# Patient Record
Sex: MALE | Race: BLACK OR AFRICAN AMERICAN | Employment: UNEMPLOYED | URBAN - METROPOLITAN AREA
[De-identification: names, ages, dates, MRNs, and addresses within clinical notes are randomized per-mention and may not be internally consistent; named-entity substitution may affect disease eponyms.]

---

## 2017-01-01 ENCOUNTER — TELEPHONE (OUTPATIENT)
Dept: INTERNAL MEDICINE CLINIC | Age: 62
End: 2017-01-01

## 2017-01-01 ENCOUNTER — OFFICE VISIT (OUTPATIENT)
Dept: INTERNAL MEDICINE CLINIC | Age: 62
End: 2017-01-01

## 2017-01-01 VITALS
BODY MASS INDEX: 16.1 KG/M2 | HEART RATE: 88 BPM | HEIGHT: 71 IN | DIASTOLIC BLOOD PRESSURE: 56 MMHG | OXYGEN SATURATION: 100 % | SYSTOLIC BLOOD PRESSURE: 100 MMHG | WEIGHT: 115 LBS | TEMPERATURE: 98.8 F | RESPIRATION RATE: 20 BRPM

## 2017-01-01 DIAGNOSIS — R91.8 MASS OF RIGHT LUNG: ICD-10-CM

## 2017-01-01 DIAGNOSIS — E43 SEVERE PROTEIN-CALORIE MALNUTRITION (HCC): ICD-10-CM

## 2017-01-01 DIAGNOSIS — J44.1 CHRONIC OBSTRUCTIVE PULMONARY DISEASE WITH ACUTE EXACERBATION (HCC): Primary | ICD-10-CM

## 2017-01-01 DIAGNOSIS — Z00.00 PREVENTATIVE HEALTH CARE: ICD-10-CM

## 2017-01-01 DIAGNOSIS — F10.10 ALCOHOL ABUSE: ICD-10-CM

## 2017-01-01 LAB
ALBUMIN SERPL-MCNC: 3.8 G/DL (ref 3.4–5)
ALP BLD-CCNC: 110 U/L (ref 40–129)
ALT SERPL-CCNC: 17 U/L (ref 10–40)
ANION GAP SERPL CALCULATED.3IONS-SCNC: 15 MMOL/L (ref 3–16)
AST SERPL-CCNC: 35 U/L (ref 15–37)
BASOPHILS ABSOLUTE: 0.1 K/UL (ref 0–0.2)
BASOPHILS RELATIVE PERCENT: 1 %
BILIRUB SERPL-MCNC: 0.6 MG/DL (ref 0–1)
BILIRUBIN DIRECT: <0.2 MG/DL (ref 0–0.3)
BILIRUBIN, INDIRECT: ABNORMAL MG/DL (ref 0–1)
BUN BLDV-MCNC: 6 MG/DL (ref 7–20)
CALCIUM SERPL-MCNC: 9.2 MG/DL (ref 8.3–10.6)
CHLORIDE BLD-SCNC: 100 MMOL/L (ref 99–110)
CHOLESTEROL, TOTAL: 104 MG/DL (ref 0–199)
CO2: 23 MMOL/L (ref 21–32)
CREAT SERPL-MCNC: 0.6 MG/DL (ref 0.8–1.3)
EOSINOPHILS ABSOLUTE: 0.1 K/UL (ref 0–0.6)
EOSINOPHILS RELATIVE PERCENT: 1.2 %
ESTIMATED AVERAGE GLUCOSE: 88.2 MG/DL
GFR AFRICAN AMERICAN: >60
GFR NON-AFRICAN AMERICAN: >60
GLUCOSE BLD-MCNC: 77 MG/DL (ref 70–99)
HBA1C MFR BLD: 4.7 %
HCT VFR BLD CALC: 19.7 % (ref 40.5–52.5)
HDLC SERPL-MCNC: 59 MG/DL (ref 40–60)
HEMATOLOGY PATH CONSULT: NO
HEMOGLOBIN: 5.1 G/DL (ref 13.5–17.5)
HEPATITIS C ANTIBODY INTERPRETATION: REACTIVE
HIV-1 AND HIV-2 ANTIBODIES: NORMAL
LDL CHOLESTEROL CALCULATED: 34 MG/DL
LYMPHOCYTES ABSOLUTE: 1.4 K/UL (ref 1–5.1)
LYMPHOCYTES RELATIVE PERCENT: 13.3 %
MCH RBC QN AUTO: 16.1 PG (ref 26–34)
MCHC RBC AUTO-ENTMCNC: 26.1 G/DL (ref 31–36)
MCV RBC AUTO: 61.9 FL (ref 80–100)
MONOCYTES ABSOLUTE: 1.3 K/UL (ref 0–1.3)
MONOCYTES RELATIVE PERCENT: 12.3 %
NEUTROPHILS ABSOLUTE: 7.8 K/UL (ref 1.7–7.7)
NEUTROPHILS RELATIVE PERCENT: 72.2 %
PDW BLD-RTO: 20.8 % (ref 12.4–15.4)
PHOSPHORUS: 4 MG/DL (ref 2.5–4.9)
PLATELET # BLD: 424 K/UL (ref 135–450)
PMV BLD AUTO: 7.5 FL (ref 5–10.5)
POTASSIUM SERPL-SCNC: 4.6 MMOL/L (ref 3.5–5.1)
RBC # BLD: 3.19 M/UL (ref 4.2–5.9)
SODIUM BLD-SCNC: 138 MMOL/L (ref 136–145)
T4 FREE: 1.1 NG/DL (ref 0.9–1.8)
TOTAL PROTEIN: 8.4 G/DL (ref 6.4–8.2)
TRIGL SERPL-MCNC: 56 MG/DL (ref 0–150)
TSH SERPL DL<=0.05 MIU/L-ACNC: 1.77 UIU/ML (ref 0.27–4.2)
VITAMIN B-12: 661 PG/ML (ref 211–911)
VLDLC SERPL CALC-MCNC: 11 MG/DL
WBC # BLD: 10.8 K/UL (ref 4–11)

## 2017-01-01 PROCEDURE — 99204 OFFICE O/P NEW MOD 45 MIN: CPT | Performed by: INTERNAL MEDICINE

## 2017-01-01 PROCEDURE — 3023F SPIROM DOC REV: CPT | Performed by: INTERNAL MEDICINE

## 2017-01-01 PROCEDURE — 4004F PT TOBACCO SCREEN RCVD TLK: CPT | Performed by: INTERNAL MEDICINE

## 2017-01-01 PROCEDURE — G8419 CALC BMI OUT NRM PARAM NOF/U: HCPCS | Performed by: INTERNAL MEDICINE

## 2017-01-01 PROCEDURE — 90686 IIV4 VACC NO PRSV 0.5 ML IM: CPT | Performed by: INTERNAL MEDICINE

## 2017-01-01 PROCEDURE — 3017F COLORECTAL CA SCREEN DOC REV: CPT | Performed by: INTERNAL MEDICINE

## 2017-01-01 PROCEDURE — G8484 FLU IMMUNIZE NO ADMIN: HCPCS | Performed by: INTERNAL MEDICINE

## 2017-01-01 PROCEDURE — 90471 IMMUNIZATION ADMIN: CPT | Performed by: INTERNAL MEDICINE

## 2017-01-01 PROCEDURE — G8427 DOCREV CUR MEDS BY ELIG CLIN: HCPCS | Performed by: INTERNAL MEDICINE

## 2017-01-01 PROCEDURE — G8926 SPIRO NO PERF OR DOC: HCPCS | Performed by: INTERNAL MEDICINE

## 2017-01-01 RX ORDER — MULTIVITAMIN WITH FOLIC ACID 400 MCG
1 TABLET ORAL DAILY
Qty: 180 TABLET | Refills: 3 | Status: SHIPPED | OUTPATIENT
Start: 2017-01-01 | End: 2018-01-01 | Stop reason: ALTCHOICE

## 2017-01-01 RX ORDER — PREDNISONE 10 MG/1
40 TABLET ORAL DAILY
Qty: 28 TABLET | Refills: 0 | Status: ON HOLD | OUTPATIENT
Start: 2017-01-01 | End: 2017-01-01 | Stop reason: HOSPADM

## 2017-01-01 RX ORDER — BROMPHENIRAMINE MALEATE, PSEUDOEPHEDRINE HYDROCHLORIDE, AND DEXTROMETHORPHAN HYDROBROMIDE 2; 30; 10 MG/5ML; MG/5ML; MG/5ML
10 SYRUP ORAL EVERY 4 HOURS PRN
COMMUNITY
End: 2018-01-01

## 2017-01-01 RX ORDER — BENZONATATE 200 MG/1
200 CAPSULE ORAL 3 TIMES DAILY PRN
COMMUNITY
End: 2018-01-01 | Stop reason: ALTCHOICE

## 2017-01-01 RX ORDER — DOXYCYCLINE HYCLATE 100 MG/1
100 CAPSULE ORAL 2 TIMES DAILY
Qty: 14 CAPSULE | Refills: 0 | Status: ON HOLD | OUTPATIENT
Start: 2017-01-01 | End: 2017-01-01 | Stop reason: HOSPADM

## 2017-01-01 RX ORDER — ALBUTEROL SULFATE 90 UG/1
2 AEROSOL, METERED RESPIRATORY (INHALATION) EVERY 4 HOURS PRN
COMMUNITY
End: 2018-01-01 | Stop reason: ALTCHOICE

## 2017-01-01 ASSESSMENT — ENCOUNTER SYMPTOMS
EYES NEGATIVE: 1
VOMITING: 0
BLOOD IN STOOL: 0
COUGH: 1
SHORTNESS OF BREATH: 0
SORE THROAT: 1
DIARRHEA: 0
GASTROINTESTINAL NEGATIVE: 1
HEMOPTYSIS: 0
RHINORRHEA: 0
WHEEZING: 0

## 2017-10-09 NOTE — TELEPHONE ENCOUNTER
Kajal James is pt's  and she stated that pt was unable to come to his New pt appt today because he was not at the address were he was supposed to be picked up at. Pt's  transportation was unable to pick him up. She stated that pt is homeless and he has been staying with different relatives. She is sorry about this but she wants to know if he can reschedule his new pt appt? Please advise.

## 2017-10-30 PROBLEM — F10.10 ALCOHOL ABUSE: Status: ACTIVE | Noted: 2017-01-01

## 2017-10-31 NOTE — TELEPHONE ENCOUNTER
Pt sister stated that she is returning Dr Yosi Arzate call. Please call her at phone# provided. She is on her way to get her brother.

## 2017-10-31 NOTE — TELEPHONE ENCOUNTER
Per Dr. Maverick Mcclain, patient's sister, Nila Lerma called and advised that patient needs to go to [facility] ER for evaluation now due to low blood count and abnormal labs. She will contact her brother and take him there immediately.

## 2017-10-31 NOTE — TELEPHONE ENCOUNTER
On Call: 10:45 pm - lab called with critical HGb : 5.1. In pt with cachexia , past anemia , seen today by Dr Geovanna Olson.  Tried to reach pt - not home- left message with sister, Bong Rollins that he should call office in the morning

## 2017-11-01 PROBLEM — R65.10 SIRS (SYSTEMIC INFLAMMATORY RESPONSE SYNDROME) (HCC): Status: ACTIVE | Noted: 2017-01-01

## 2017-11-02 PROBLEM — E43 SEVERE MALNUTRITION (HCC): Chronic | Status: ACTIVE | Noted: 2017-01-01

## 2018-01-01 ENCOUNTER — OFFICE VISIT (OUTPATIENT)
Dept: ENT CLINIC | Age: 63
End: 2018-01-01

## 2018-01-01 ENCOUNTER — TELEPHONE (OUTPATIENT)
Dept: ENT CLINIC | Age: 63
End: 2018-01-01

## 2018-01-01 ENCOUNTER — HOSPITAL ENCOUNTER (OUTPATIENT)
Dept: OTHER | Age: 63
Discharge: HOME | End: 2018-01-24
Attending: RADIOLOGY | Admitting: RADIOLOGY

## 2018-01-01 ENCOUNTER — PRE-PROCEDURE TELEPHONE (OUTPATIENT)
Dept: INTERVENTIONAL RADIOLOGY/VASCULAR | Age: 63
End: 2018-01-01

## 2018-01-01 ENCOUNTER — HOSPITAL ENCOUNTER (EMERGENCY)
Age: 63
Discharge: ANOTHER HOSPITAL | End: 2018-09-22
Attending: EMERGENCY MEDICINE
Payer: MEDICARE

## 2018-01-01 ENCOUNTER — TELEPHONE (OUTPATIENT)
Dept: INTERNAL MEDICINE CLINIC | Age: 63
End: 2018-01-01

## 2018-01-01 ENCOUNTER — HOSPITAL ENCOUNTER (OUTPATIENT)
Dept: SURGERY | Age: 63
Discharge: HOME | End: 2018-01-08
Attending: OTOLARYNGOLOGY | Admitting: OTOLARYNGOLOGY

## 2018-01-01 ENCOUNTER — OFFICE VISIT (OUTPATIENT)
Dept: INTERNAL MEDICINE CLINIC | Age: 63
End: 2018-01-01

## 2018-01-01 ENCOUNTER — TELEPHONE (OUTPATIENT)
Dept: CT IMAGING | Age: 63
End: 2018-01-01

## 2018-01-01 ENCOUNTER — HOSPITAL ENCOUNTER (OUTPATIENT)
Dept: ULTRASOUND IMAGING | Age: 63
Discharge: HOME | End: 2018-01-03
Attending: OTOLARYNGOLOGY | Admitting: OTOLARYNGOLOGY

## 2018-01-01 VITALS — HEART RATE: 88 BPM | DIASTOLIC BLOOD PRESSURE: 60 MMHG | SYSTOLIC BLOOD PRESSURE: 104 MMHG

## 2018-01-01 VITALS
BODY MASS INDEX: 13.73 KG/M2 | TEMPERATURE: 98.5 F | SYSTOLIC BLOOD PRESSURE: 131 MMHG | OXYGEN SATURATION: 96 % | HEART RATE: 82 BPM | WEIGHT: 107 LBS | DIASTOLIC BLOOD PRESSURE: 84 MMHG | RESPIRATION RATE: 16 BRPM | HEIGHT: 74 IN

## 2018-01-01 VITALS
WEIGHT: 101.31 LBS | BODY MASS INDEX: 13.01 KG/M2 | HEART RATE: 99 BPM | OXYGEN SATURATION: 100 % | SYSTOLIC BLOOD PRESSURE: 105 MMHG | DIASTOLIC BLOOD PRESSURE: 64 MMHG

## 2018-01-01 VITALS — HEART RATE: 88 BPM | SYSTOLIC BLOOD PRESSURE: 110 MMHG | DIASTOLIC BLOOD PRESSURE: 76 MMHG

## 2018-01-01 VITALS
WEIGHT: 99 LBS | BODY MASS INDEX: 12.71 KG/M2 | HEART RATE: 72 BPM | RESPIRATION RATE: 20 BRPM | SYSTOLIC BLOOD PRESSURE: 100 MMHG | DIASTOLIC BLOOD PRESSURE: 60 MMHG

## 2018-01-01 VITALS — HEIGHT: 74 IN | BODY MASS INDEX: 11.42 KG/M2 | WEIGHT: 89 LBS

## 2018-01-01 DIAGNOSIS — Z85.21 HX OF LARYNGEAL CANCER: ICD-10-CM

## 2018-01-01 DIAGNOSIS — C32.8 SQUAMOUS CELL CARCINOMA OF OVERLAPPING SITES OF LARYNX (HCC): Primary | ICD-10-CM

## 2018-01-01 DIAGNOSIS — C32.8 CANCER OF OVERLAPPING SITES OF LARYNX (HCC): ICD-10-CM

## 2018-01-01 DIAGNOSIS — R22.1 MASS OF RIGHT SIDE OF NECK: ICD-10-CM

## 2018-01-01 DIAGNOSIS — E43 SEVERE MALNUTRITION (HCC): Chronic | ICD-10-CM

## 2018-01-01 DIAGNOSIS — K13.79 HEMORRHAGE FROM MOUTH: Primary | ICD-10-CM

## 2018-01-01 DIAGNOSIS — R22.1 LOCALIZED SWELLING, MASS OR LUMP OF NECK: ICD-10-CM

## 2018-01-01 DIAGNOSIS — R22.1 MASS OF RIGHT SIDE OF NECK: Primary | ICD-10-CM

## 2018-01-01 DIAGNOSIS — J44.9 STAGE 4 VERY SEVERE COPD BY GOLD CLASSIFICATION (HCC): ICD-10-CM

## 2018-01-01 DIAGNOSIS — R69 TERMINAL ILLNESS: Primary | ICD-10-CM

## 2018-01-01 LAB
A/G RATIO: 0.5 (ref 1.1–2.2)
ALBUMIN SERPL-MCNC: 2.6 G/DL (ref 3.4–5)
ALP BLD-CCNC: 88 U/L (ref 40–129)
ALT SERPL-CCNC: 31 U/L (ref 10–40)
ANAEROBIC CULTURE: NORMAL
ANION GAP SERPL CALCULATED.3IONS-SCNC: 10 MMOL/L (ref 3–16)
APTT: 21.3 SEC (ref 26–36)
AST SERPL-CCNC: 28 U/L (ref 15–37)
BASOPHILS ABSOLUTE: 0.1 K/UL (ref 0–0.2)
BASOPHILS ABSOLUTE: 0.1 K/UL (ref 0–0.2)
BASOPHILS RELATIVE PERCENT: 0.5 %
BASOPHILS RELATIVE PERCENT: 0.9 %
BILIRUB SERPL-MCNC: 0.3 MG/DL (ref 0–1)
BUN BLDV-MCNC: 13 MG/DL (ref 7–20)
CALCIUM SERPL-MCNC: 8.7 MG/DL (ref 8.3–10.6)
CHLORIDE BLD-SCNC: 100 MMOL/L (ref 99–110)
CO2: 26 MMOL/L (ref 21–32)
CREAT SERPL-MCNC: <0.5 MG/DL (ref 0.8–1.3)
EOSINOPHILS ABSOLUTE: 0.1 K/UL (ref 0–0.6)
EOSINOPHILS ABSOLUTE: 0.1 K/UL (ref 0–0.6)
EOSINOPHILS RELATIVE PERCENT: 0.5 %
EOSINOPHILS RELATIVE PERCENT: 1.2 %
GFR AFRICAN AMERICAN: >60
GFR NON-AFRICAN AMERICAN: >60
GLOBULIN: 5.2 G/DL
GLUCOSE BLD-MCNC: 187 MG/DL (ref 70–99)
GRAM STAIN RESULT: NORMAL
HCT VFR BLD CALC: 28.2 % (ref 40.5–52.5)
HCT VFR BLD CALC: 44.2 % (ref 40.5–52.5)
HEMOGLOBIN: 14 G/DL (ref 13.5–17.5)
HEMOGLOBIN: 8.8 G/DL (ref 13.5–17.5)
INR BLD: 1.16 (ref 0.86–1.14)
LYMPHOCYTES ABSOLUTE: 0.3 K/UL (ref 1–5.1)
LYMPHOCYTES ABSOLUTE: 1.1 K/UL (ref 1–5.1)
LYMPHOCYTES RELATIVE PERCENT: 13 %
LYMPHOCYTES RELATIVE PERCENT: 2.6 %
MCH RBC QN AUTO: 26.2 PG (ref 26–34)
MCH RBC QN AUTO: 27.2 PG (ref 26–34)
MCHC RBC AUTO-ENTMCNC: 31.3 G/DL (ref 31–36)
MCHC RBC AUTO-ENTMCNC: 31.8 G/DL (ref 31–36)
MCV RBC AUTO: 83.6 FL (ref 80–100)
MCV RBC AUTO: 85.6 FL (ref 80–100)
MONOCYTES ABSOLUTE: 0.8 K/UL (ref 0–1.3)
MONOCYTES ABSOLUTE: 1.1 K/UL (ref 0–1.3)
MONOCYTES RELATIVE PERCENT: 13.8 %
MONOCYTES RELATIVE PERCENT: 6.8 %
NEUTROPHILS ABSOLUTE: 10.4 K/UL (ref 1.7–7.7)
NEUTROPHILS ABSOLUTE: 5.9 K/UL (ref 1.7–7.7)
NEUTROPHILS RELATIVE PERCENT: 71.1 %
NEUTROPHILS RELATIVE PERCENT: 89.6 %
PDW BLD-RTO: 16.3 % (ref 12.4–15.4)
PDW BLD-RTO: 16.3 % (ref 12.4–15.4)
PLATELET # BLD: 256 K/UL (ref 135–450)
PLATELET # BLD: 424 K/UL (ref 135–450)
PMV BLD AUTO: 7.2 FL (ref 5–10.5)
PMV BLD AUTO: 8.7 FL (ref 5–10.5)
POTASSIUM REFLEX MAGNESIUM: 3.9 MMOL/L (ref 3.5–5.1)
PROTHROMBIN TIME: 13.2 SEC (ref 9.8–13)
RBC # BLD: 3.37 M/UL (ref 4.2–5.9)
RBC # BLD: 5.16 M/UL (ref 4.2–5.9)
SODIUM BLD-SCNC: 136 MMOL/L (ref 136–145)
TOTAL PROTEIN: 7.8 G/DL (ref 6.4–8.2)
WBC # BLD: 11.6 K/UL (ref 4–11)
WBC # BLD: 8.3 K/UL (ref 4–11)
WOUND/ABSCESS: NORMAL

## 2018-01-01 PROCEDURE — 96375 TX/PRO/DX INJ NEW DRUG ADDON: CPT

## 2018-01-01 PROCEDURE — G8484 FLU IMMUNIZE NO ADMIN: HCPCS | Performed by: OTOLARYNGOLOGY

## 2018-01-01 PROCEDURE — 3017F COLORECTAL CA SCREEN DOC REV: CPT | Performed by: OTOLARYNGOLOGY

## 2018-01-01 PROCEDURE — 43197 ESOPHAGOSCOPY FLEX DX BRUSH: CPT | Performed by: OTOLARYNGOLOGY

## 2018-01-01 PROCEDURE — G8427 DOCREV CUR MEDS BY ELIG CLIN: HCPCS | Performed by: INTERNAL MEDICINE

## 2018-01-01 PROCEDURE — 96374 THER/PROPH/DIAG INJ IV PUSH: CPT

## 2018-01-01 PROCEDURE — 4004F PT TOBACCO SCREEN RCVD TLK: CPT | Performed by: OTOLARYNGOLOGY

## 2018-01-01 PROCEDURE — 31502 CHANGE OF WINDPIPE AIRWAY: CPT

## 2018-01-01 PROCEDURE — 99024 POSTOP FOLLOW-UP VISIT: CPT | Performed by: OTOLARYNGOLOGY

## 2018-01-01 PROCEDURE — 99204 OFFICE O/P NEW MOD 45 MIN: CPT | Performed by: OTOLARYNGOLOGY

## 2018-01-01 PROCEDURE — 1111F DSCHRG MED/CURRENT MED MERGE: CPT | Performed by: OTOLARYNGOLOGY

## 2018-01-01 PROCEDURE — 99212 OFFICE O/P EST SF 10 MIN: CPT | Performed by: OTOLARYNGOLOGY

## 2018-01-01 PROCEDURE — 80053 COMPREHEN METABOLIC PANEL: CPT

## 2018-01-01 PROCEDURE — G8419 CALC BMI OUT NRM PARAM NOF/U: HCPCS | Performed by: INTERNAL MEDICINE

## 2018-01-01 PROCEDURE — 3017F COLORECTAL CA SCREEN DOC REV: CPT | Performed by: INTERNAL MEDICINE

## 2018-01-01 PROCEDURE — 99285 EMERGENCY DEPT VISIT HI MDM: CPT

## 2018-01-01 PROCEDURE — G8419 CALC BMI OUT NRM PARAM NOF/U: HCPCS | Performed by: OTOLARYNGOLOGY

## 2018-01-01 PROCEDURE — 31535 LARYNGOSCOPY W/BIOPSY: CPT | Performed by: OTOLARYNGOLOGY

## 2018-01-01 PROCEDURE — G8427 DOCREV CUR MEDS BY ELIG CLIN: HCPCS | Performed by: OTOLARYNGOLOGY

## 2018-01-01 PROCEDURE — 85025 COMPLETE CBC W/AUTO DIFF WBC: CPT

## 2018-01-01 PROCEDURE — 2580000003 HC RX 258: Performed by: PHYSICIAN ASSISTANT

## 2018-01-01 PROCEDURE — 1036F TOBACCO NON-USER: CPT | Performed by: INTERNAL MEDICINE

## 2018-01-01 PROCEDURE — 1036F TOBACCO NON-USER: CPT | Performed by: OTOLARYNGOLOGY

## 2018-01-01 PROCEDURE — 6360000002 HC RX W HCPCS: Performed by: PHYSICIAN ASSISTANT

## 2018-01-01 PROCEDURE — 3023F SPIROM DOC REV: CPT | Performed by: INTERNAL MEDICINE

## 2018-01-01 PROCEDURE — 2500000003 HC RX 250 WO HCPCS: Performed by: PHYSICIAN ASSISTANT

## 2018-01-01 PROCEDURE — 85730 THROMBOPLASTIN TIME PARTIAL: CPT

## 2018-01-01 PROCEDURE — G8926 SPIRO NO PERF OR DOC: HCPCS | Performed by: INTERNAL MEDICINE

## 2018-01-01 PROCEDURE — 99213 OFFICE O/P EST LOW 20 MIN: CPT | Performed by: INTERNAL MEDICINE

## 2018-01-01 PROCEDURE — 96361 HYDRATE IV INFUSION ADD-ON: CPT

## 2018-01-01 PROCEDURE — S0028 INJECTION, FAMOTIDINE, 20 MG: HCPCS | Performed by: PHYSICIAN ASSISTANT

## 2018-01-01 PROCEDURE — G8482 FLU IMMUNIZE ORDER/ADMIN: HCPCS | Performed by: OTOLARYNGOLOGY

## 2018-01-01 PROCEDURE — 85610 PROTHROMBIN TIME: CPT

## 2018-01-01 RX ORDER — ONDANSETRON 2 MG/ML
4 INJECTION INTRAMUSCULAR; INTRAVENOUS PRN
Status: DISCONTINUED | OUTPATIENT
Start: 2018-01-01 | End: 2018-01-01 | Stop reason: HOSPADM

## 2018-01-01 RX ORDER — ONDANSETRON 2 MG/ML
4 INJECTION INTRAMUSCULAR; INTRAVENOUS EVERY 6 HOURS PRN
Status: CANCELLED | OUTPATIENT
Start: 2018-01-01

## 2018-01-01 RX ORDER — HYDROMORPHONE HCL 110MG/55ML
0.25 PATIENT CONTROLLED ANALGESIA SYRINGE INTRAVENOUS EVERY 5 MIN PRN
Status: DISCONTINUED | OUTPATIENT
Start: 2018-01-01 | End: 2018-01-01 | Stop reason: HOSPADM

## 2018-01-01 RX ORDER — FENTANYL CITRATE 50 UG/ML
50 INJECTION, SOLUTION INTRAMUSCULAR; INTRAVENOUS EVERY 5 MIN PRN
Status: DISCONTINUED | OUTPATIENT
Start: 2018-01-01 | End: 2018-01-01 | Stop reason: HOSPADM

## 2018-01-01 RX ORDER — OXYCODONE HYDROCHLORIDE 5 MG/1
5 TABLET ORAL PRN
Status: ACTIVE | OUTPATIENT
Start: 2018-01-01 | End: 2018-01-01

## 2018-01-01 RX ORDER — FLUDEOXYGLUCOSE F 18 200 MCI/ML
14.4 INJECTION, SOLUTION INTRAVENOUS
Status: COMPLETED | OUTPATIENT
Start: 2018-01-01 | End: 2018-01-01

## 2018-01-01 RX ORDER — OXYCODONE HYDROCHLORIDE 5 MG/1
10 TABLET ORAL PRN
Status: ACTIVE | OUTPATIENT
Start: 2018-01-01 | End: 2018-01-01

## 2018-01-01 RX ORDER — ACETAMINOPHEN 325 MG/1
650 TABLET ORAL EVERY 4 HOURS PRN
Status: CANCELLED | OUTPATIENT
Start: 2018-01-01

## 2018-01-01 RX ORDER — LIDOCAINE HYDROCHLORIDE 10 MG/ML
1 INJECTION, SOLUTION EPIDURAL; INFILTRATION; INTRACAUDAL; PERINEURAL
Status: ACTIVE | OUTPATIENT
Start: 2018-01-01 | End: 2018-01-01

## 2018-01-01 RX ORDER — DIPHENHYDRAMINE HYDROCHLORIDE 50 MG/ML
25 INJECTION INTRAMUSCULAR; INTRAVENOUS ONCE
Status: COMPLETED | OUTPATIENT
Start: 2018-01-01 | End: 2018-01-01

## 2018-01-01 RX ORDER — OMEPRAZOLE 20 MG/1
20 CAPSULE, DELAYED RELEASE ORAL DAILY
COMMUNITY
End: 2018-01-01 | Stop reason: ALTCHOICE

## 2018-01-01 RX ORDER — FENTANYL CITRATE 50 UG/ML
25 INJECTION, SOLUTION INTRAMUSCULAR; INTRAVENOUS EVERY 5 MIN PRN
Status: DISCONTINUED | OUTPATIENT
Start: 2018-01-01 | End: 2018-01-01 | Stop reason: HOSPADM

## 2018-01-01 RX ORDER — ONDANSETRON 2 MG/ML
4 INJECTION INTRAMUSCULAR; INTRAVENOUS
Status: ACTIVE | OUTPATIENT
Start: 2018-01-01 | End: 2018-01-01

## 2018-01-01 RX ORDER — HYDROMORPHONE HCL 110MG/55ML
0.5 PATIENT CONTROLLED ANALGESIA SYRINGE INTRAVENOUS EVERY 5 MIN PRN
Status: DISCONTINUED | OUTPATIENT
Start: 2018-01-01 | End: 2018-01-01 | Stop reason: HOSPADM

## 2018-01-01 RX ORDER — SODIUM CHLORIDE 0.9 % (FLUSH) 0.9 %
10 SYRINGE (ML) INJECTION PRN
Status: CANCELLED | OUTPATIENT
Start: 2018-01-01

## 2018-01-01 RX ORDER — LIDOCAINE HYDROCHLORIDE 10 MG/ML
5 INJECTION, SOLUTION EPIDURAL; INFILTRATION; INTRACAUDAL; PERINEURAL ONCE
Status: DISCONTINUED | OUTPATIENT
Start: 2018-01-01 | End: 2018-01-01 | Stop reason: SDUPTHER

## 2018-01-01 RX ORDER — SODIUM CHLORIDE 0.9 % (FLUSH) 0.9 %
10 SYRINGE (ML) INJECTION PRN
Status: DISCONTINUED | OUTPATIENT
Start: 2018-01-01 | End: 2018-01-01 | Stop reason: HOSPADM

## 2018-01-01 RX ORDER — MEPERIDINE HYDROCHLORIDE 25 MG/ML
12.5 INJECTION INTRAMUSCULAR; INTRAVENOUS; SUBCUTANEOUS EVERY 5 MIN PRN
Status: DISCONTINUED | OUTPATIENT
Start: 2018-01-01 | End: 2018-01-01 | Stop reason: HOSPADM

## 2018-01-01 RX ORDER — 0.9 % SODIUM CHLORIDE 0.9 %
1000 INTRAVENOUS SOLUTION INTRAVENOUS ONCE
Status: COMPLETED | OUTPATIENT
Start: 2018-01-01 | End: 2018-01-01

## 2018-01-01 RX ORDER — METHYLPREDNISOLONE SODIUM SUCCINATE 125 MG/2ML
125 INJECTION, POWDER, LYOPHILIZED, FOR SOLUTION INTRAMUSCULAR; INTRAVENOUS ONCE
Status: COMPLETED | OUTPATIENT
Start: 2018-01-01 | End: 2018-01-01

## 2018-01-01 RX ORDER — SODIUM CHLORIDE 9 MG/ML
INJECTION, SOLUTION INTRAVENOUS CONTINUOUS
Status: DISCONTINUED | OUTPATIENT
Start: 2018-01-01 | End: 2018-01-01 | Stop reason: HOSPADM

## 2018-01-01 RX ORDER — SODIUM CHLORIDE 0.9 % (FLUSH) 0.9 %
10 SYRINGE (ML) INJECTION EVERY 12 HOURS SCHEDULED
Status: CANCELLED | OUTPATIENT
Start: 2018-01-01

## 2018-01-01 RX ORDER — SODIUM CHLORIDE 0.9 % (FLUSH) 0.9 %
10 SYRINGE (ML) INJECTION EVERY 12 HOURS SCHEDULED
Status: DISCONTINUED | OUTPATIENT
Start: 2018-01-01 | End: 2018-01-01 | Stop reason: HOSPADM

## 2018-01-01 RX ORDER — BACITRACIN, NEOMYCIN, POLYMYXIN B 400; 3.5; 5 [USP'U]/G; MG/G; [USP'U]/G
OINTMENT TOPICAL ONCE
Status: DISCONTINUED | OUTPATIENT
Start: 2018-01-01 | End: 2018-01-01 | Stop reason: SDUPTHER

## 2018-01-01 RX ADMIN — SODIUM CHLORIDE: 9 INJECTION, SOLUTION INTRAVENOUS at 07:55

## 2018-01-01 RX ADMIN — SODIUM CHLORIDE 1000 ML: 9 INJECTION, SOLUTION INTRAVENOUS at 20:35

## 2018-01-01 RX ADMIN — DIPHENHYDRAMINE HYDROCHLORIDE 25 MG: 50 INJECTION, SOLUTION INTRAMUSCULAR; INTRAVENOUS at 20:30

## 2018-01-01 RX ADMIN — FLUDEOXYGLUCOSE F 18 14.4 MILLICURIE: 200 INJECTION, SOLUTION INTRAVENOUS at 15:12

## 2018-01-01 RX ADMIN — METHYLPREDNISOLONE SODIUM SUCCINATE 125 MG: 125 INJECTION, POWDER, FOR SOLUTION INTRAMUSCULAR; INTRAVENOUS at 20:31

## 2018-01-01 RX ADMIN — Medication 20 MG: at 20:31

## 2018-01-01 ASSESSMENT — ENCOUNTER SYMPTOMS
SHORTNESS OF BREATH: 0
SHORTNESS OF BREATH: 0
DIARRHEA: 0
CHEST TIGHTNESS: 0
TROUBLE SWALLOWING: 1
COUGH: 1

## 2018-01-01 ASSESSMENT — PATIENT HEALTH QUESTIONNAIRE - PHQ9
1. LITTLE INTEREST OR PLEASURE IN DOING THINGS: 0
SUM OF ALL RESPONSES TO PHQ9 QUESTIONS 1 & 2: 0
SUM OF ALL RESPONSES TO PHQ QUESTIONS 1-9: 0
2. FEELING DOWN, DEPRESSED OR HOPELESS: 0

## 2018-01-01 ASSESSMENT — PAIN - FUNCTIONAL ASSESSMENT: PAIN_FUNCTIONAL_ASSESSMENT: 0-10

## 2018-01-03 PROBLEM — R22.1 MASS OF RIGHT SIDE OF NECK: Status: ACTIVE | Noted: 2018-01-01

## 2018-01-03 NOTE — PROGRESS NOTES
configuration, with no displacement of the palatoglossal or palatopharyngeal folds, and no obstruction to the oropharynx on the left. On the right however the palatoglossal glossal fold appears displaced toward the midline The posterior pharyngeal wall is normal appearing without exudate or drainage. Mirror examination reveals an asymmetry in the base of the tongue on the right that extends to the vallecula. This tends to distort the right side of the epiglottis but no discrete mucosal lesion is seen. In the right neck there is a 3 x 3 cm jugulodigastric node that is firm mobile and minimally tender. There appears to be a satellite node lateral to this.   No other adenopathy is palpable neck    Impression:  Right neck mass suspicious for metastatic disease    Plan:  The patient will undergo a panendoscopy  He will be scheduled for an ultrasound-guided needle biopsy of his neck mass

## 2018-01-08 NOTE — H&P
[facility]   [facility]ISTORY AND [facility]      I am seeing Kareem Houser at the request of Angela for evaluation of the patient's medical problems prior to panendoscopy and biopsy     1/8/2018 7:02 AM    Patient Information:  Kareem Houser is a 60 y.o. male [id number]  PCP:  Referring Not In System (Inactive) (Tel: None )    Chief complaint: I got a lump on my neck     History of Present Illness:  Mary Leija is a 60 y.o. male who presented with a lesion on the right side of his neck. Symptom onset was gradual for a time period of the past 1 month. The severity is described as moderate. The course of his symptoms over time is recurrent. The symptoms improved with unknown and worsened when he attempts to lay on the right side. The patient's symptom is associated with voice hoarseness,\"frequent spitting\" and weight loss. He continues to smoke cigarettes. History obtained from patient and record review:      Microcytic anemia - previous Hgb electrophoresis normal.  B12 normal. Iron deficient. EGD  showed gastritis and duodenal AVMs that were ablated.   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 No cirrhosis/ splenomegaly noted on CT but pt know to be HCV + and hx alcoholism. REVIEW OF SYSTEMS:   Constitutional:  Negative for fever,chills or night sweats  ENT:  Negative for rhinorrhea, epistaxis, + hoarseness, sore throat. Respiratory:   Negative for shortness of breath,wheezing  Cardiovascular:   Negative for  chest pain, palpitations   Gastrointestinal:  Negative for nausea, vomiting, diarrhea  Genitourinary:  Negative for polyuria, dysuria   Hematologic/Lymphatic:  Negative for  bleeding tendency, easy bruising  Musculoskeletal:  Negative for myalgias and arthralgias  Neurologic:  Negative for  confusion,dysarthria.   Skin:  Negative for includes Arthritis in his father; High Blood Pressure in his father and mother. Physical Exam:  /73   Pulse 78   Temp 98.8 °F (37.1 °C) (Temporal)   Resp 16   Ht 6' 2\" (1.88 m)   Wt 107 lb (48.5 kg)   SpO2 98%   BMI 13.74 kg/m²     General appearance:  Appears comfortable but very thin  Eyes: Sclera clear, pupils equal  ENT: Moist mucus membranes, no thrush. Trachea midline. Obvious right sided neck mass is visible ,  Voice is hoarse   Cardiovascular: Regular rhythm, normal S1, S2. No murmur, gallop, rub. No edema in lower extremities  Respiratory: lungs are clear. No current wheezing   Gastrointestinal: Abdomen soft, non-tender, not distended, normal bowel sounds  Musculoskeletal: No cyanosis in digits, neck supple  Neurology: Cranial nerves grossly intact. Alert and oriented in time, place and person. No speech or motor deficits  Psychiatry: Appropriate affect. Not agitated  Skin: Warm, dry, normal turgor, no rash    Labs:  CBC:   Lab Results   Component Value Date    WBC 8.3 01/08/2018    RBC 5.16 01/08/2018    HGB 14.0 01/08/2018    HCT 44.2 01/08/2018    MCV 85.6 01/08/2018    MCH 27.2 01/08/2018    MCHC 31.8 01/08/2018    RDW 16.3 01/08/2018     01/08/2018    MPV 8.7 01/08/2018     BMP:    Lab Results   Component Value Date     10/31/2017    K 4.3 10/31/2017    CL 99 10/31/2017    CO2 22 10/31/2017    BUN 4 10/31/2017    CREATININE 0.7 10/31/2017    CALCIUM 8.7 10/31/2017    GFRAA >60 10/31/2017    LABGLOM >60 10/31/2017    GLUCOSE 101 10/31/2017           Problem List  Copd/ Tobacco abuse   Neck mass  Underweight   History of Hep C     History of ETOH abuse    Assessment & Recommendation:     1. COPD with continued tobacco abuse:  Pt reports that he uses his inhalers as needed. Cessation was encouraged. 2. Iron deficiency Anemia:  Chart reviewed: was previously followed by GI and hematology. Hgb today is 14.   Pt was hospitalized in late October and require 4 units PRBC ( gastritis and duodenal AVM's )   3. Hx of Hep C:   4. Weight loss/ Neck Mass: For endoscopy/ biopsy today      Patient is  medically optimized for surgery. Thank you for the opportunity to participate in the care of your patient.   Don Sandoval CNP    1/8/2018 7:02 AM

## 2018-01-08 NOTE — ANESTHESIA PRE-OP
 oxyCODONE (ROXICODONE) immediate release tablet 5 mg  5 mg Oral PRN Maple Fleet, DO        Or    oxyCODONE (ROXICODONE) immediate release tablet 10 mg  10 mg Oral PRN Maple Fleet, DO        ondansetron [facility] injection 4 mg  4 mg Intravenous Once PRN Maple Fleet, DO        meperidine (DEMEROL) injection 12.5 mg  12.5 mg Intravenous Q5 Min PRN Maple Fleet, DO           Vital Signs  (Current) There were no vitals filed for this visit.   (for past 48 hrs)  No Data Recorded  (last three values)   BP Readings from Last 3 Encounters:   01/03/18 110/76   12/13/17 136/77   11/02/17 139/73       CBC  Lab Results   Component Value Date    WBC 8.0 10/31/2017    RBC 3.27 10/31/2017    HGB 8.8 11/02/2017    HCT 29.1 11/02/2017    MCV 58.6 10/31/2017    RDW 20.5 10/31/2017     10/31/2017       CMP    Lab Results   Component Value Date     10/31/2017    K 4.3 10/31/2017    CL 99 10/31/2017    CO2 22 10/31/2017    BUN 4 10/31/2017    CREATININE 0.7 10/31/2017    GFRAA >60 10/31/2017    AGRATIO 0.7 10/31/2017    LABGLOM >60 10/31/2017    GLUCOSE 101 10/31/2017    PROT 8.7 10/31/2017    CALCIUM 8.7 10/31/2017    BILITOT 0.6 10/31/2017    ALKPHOS 105 10/31/2017    AST 27 10/31/2017    ALT 15 10/31/2017       BMP    Lab Results   Component Value Date     10/31/2017    K 4.3 10/31/2017    CL 99 10/31/2017    CO2 22 10/31/2017    BUN 4 10/31/2017    CREATININE 0.7 10/31/2017    CALCIUM 8.7 10/31/2017    GFRAA >60 10/31/2017    LABGLOM >60 10/31/2017    GLUCOSE 101 10/31/2017       Coags   Lab Results   Component Value Date    PROTIME 13.6 11/01/2017    INR 1.20 11/01/2017    APTT 30.2 11/01/2017       HCG (If Applicable) No results found for: PREGTESTUR, PREGSERUM, HCG, HCGQUANT     ABGs  No results found for: PHART, PO2ART, ERR1KVD, JNS5SLJ, BEART, H5OJIZUO     Type & Screen (If Applicable)  No results found for: LABABO, LABRH      POCGlucose  No results for input(s): GLUCOSE in the last 72

## 2018-01-08 NOTE — PROGRESS NOTES
Adm to Phase I PACU from OR awakening. VS and respirations adquate. Cool mist applied per face tent. Blood tinged sputum.  Will continue to monitor

## 2018-01-08 NOTE — OP NOTE
[facility]                      [facility], [address]                                 OPERATIVE REPORT    PATIENT NAME: Israel Koroma                    :        1955  MED REC NO:   [id number]                          ROOM:  ACCOUNT NO:   [de-identified]                          ADMIT DATE: 2018  PROVIDER:     Zahira Nj MD    DATE OF PROCEDURE:  2018    PREOPERATIVE DIAGNOSIS:  Right neck mass. POSTOPERATIVE DIAGNOSIS:  Right neck mass, right transglottic tumor. OPERATION PERFORMED:  Direct laryngoscopy, esophagoscopy,  nasopharyngoscopy, and biopsy. SURGEON:  Zahira Nj MD.    INDICATIONS:  The patient is a 70-year-old presented with a slow growing  painless mass in the neck. He had been hospitalized recently for anemia  and malnutrition at which time the patient was not symptomatic in this  regard. However there has been ongoing hoarseness and weight loss. OPERATIVE PROCEDURE:  The patient was brought to the operating room and  placed in the table in supine position. After general anesthesia was  induced, direct laryngoscopy was carried out prior to intubation. A rolled  towel was placed under the shoulders for mild extension of the neck and the  eyes protected with tape. The dentition was in extremely poor repair,  although remaining teeth were not loose. A Bite guard was provided. It  was can be seen that there was a mass effect affecting the right endolarynx  and extending up the lateral pharyngeal wall. There was edema of the right  arytenoid and supraglottic region. The left side appeared uninvolved. The  immediate subglottic trachea could be visualized. The patient was then intubated and ventilated well throughout the remainder  of the procedure.   Laryngoscope was then reintroduced showing that the  posterior two-thirds of the cord extending on the superior surface and  toward the ventricle was distorted by an exophytic tumor. The free and  inferior edge was not involved. This extended back to the vocal process. The arytenoid did not appear involved moving to the area of epiglottic  fold, however, the arytenoid mucosa was edematous. Cup forceps was  employed to sample the right true vocal cord. The right piriform sinus was partially obliterated by tumor mass. This was  sampled as well. On the left side, the apex of the piriform sinus was  easily visualized, but on the right side, there was tumor. This appear to  extend up the lateral pharyngeal wall toward, but not including the  oropharynx. The tonsillar fossa was not involved. Biopsies were obtained  from the right lateral pharyngeal wall as well. Esophagoscope was then introduced. This could be easily introduced to the  cervical esophagus through the left piriform sinus. The postcricoid space  was clear. There was no tumor extending down as far as 16 cm below the  lower incisors. A Sandra-Jacob mouth gag was inserted for visualization of the oropharynx. The oral cavity, posterior pharyngeal wall, and palate showed  normal-appearing mucosa. A red rubber catheter was placed through the  right naris and was brought it to the mouth through retraction of the soft  palate. Mirror examination showed torus tubarius and posterior choanae  were clear. Palpation of the neck revealed a right multinodular mass in the upper  jugular chain. It was both visible and palpable. There was no extension  blow the hyoid. The left side of the neck was palpated and was  unremarkable. The patient was awakened, extubated, and taken to recovery room. He  tolerated the procedure well. Upon arousal, he will be transported to the  interventional radiology department, where an ultrasound-guided needle  biopsy of the neck mass will be undertaken.         Sam Bland MD    D: 01/08/2018 10:23:21       T: 01/08/2018 10:42:51     [email or web address]  Job#: [id number]     Doc#: [id number]

## 2018-01-12 PROBLEM — C32.8 SQUAMOUS CELL CARCINOMA OF OVERLAPPING SITES OF LARYNX (HCC): Status: ACTIVE | Noted: 2018-01-01

## 2018-01-12 NOTE — PROGRESS NOTES
The patient returns for a postop check and review of his biopsies. 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 The patient is still able to eat and drink but must direct everything to the left throat. There is no gagging or choking.   His caretaker is concerned that he continues to drink alcohol daily   The neck node appears slightly smaller since his biopsy given the fact that several cc of purulence were removed    The patient and his significant friend and I discussed the likely need for a feeding tube given his decreased caloric intake and need for future positive nitrogen balance, particularly given the effect of the radiation on his larynx and pharynx  Arrangements have been made for him to see the radiation oncologists forth with

## 2018-01-17 NOTE — TELEPHONE ENCOUNTER
I did call Yaquelin Brown and let her know that I finally got in touch with Dr Bhaskar Zamora [facility] , to make an appt for Sunnie Ormond, He has an appointment for Monday Jan 22, 12;30, Yaquelin Brown was given their telephone #, in case that will not work,  [phone]    Dr Maryjane Roberto was advised also

## 2018-01-30 PROBLEM — C76.0 HEAD AND NECK CANCER (HCC): Status: ACTIVE | Noted: 2018-01-01

## 2018-02-05 NOTE — TELEPHONE ENCOUNTER
Lulu Villareal is a dietictian and she is calling to find out if Dr Cuca Rojas will be signing orders for pt's home tube feedings?

## 2018-02-07 PROBLEM — J98.8 AIRWAY OBSTRUCTION: Status: ACTIVE | Noted: 2018-01-01

## 2018-02-08 PROBLEM — C14.0 THROAT CANCER (HCC): Status: ACTIVE | Noted: 2018-01-01

## 2018-03-06 NOTE — TELEPHONE ENCOUNTER
Called [facility], spoke to Rosalva Thakkar. Suki Perry is Shiley # 6   FiO2 at 30% 8/L. Rosalva Thakkar expresses understanding.

## 2018-03-20 NOTE — TELEPHONE ENCOUNTER
Is he under hospice care ? ?   If not, then continue until patient can tolerate sufficient calories orally, or until the patient wishes to terminate feedings    If he is under hospice care, can d/c tube feedings whenever patient loses his appetite    Neila Dakins  3/20/2018

## 2018-03-28 NOTE — TELEPHONE ENCOUNTER
Spoke with Marci Olmstead at [facility]. Patient is not under Hospice care. She confirmed she has received the signed paper work for patient.

## 2018-03-30 PROBLEM — I95.9 HYPOTENSION: Status: ACTIVE | Noted: 2018-01-01

## 2018-03-30 PROBLEM — R50.9 FEVER: Status: ACTIVE | Noted: 2018-01-01

## 2018-04-02 PROBLEM — R50.9 FEVER: Status: RESOLVED | Noted: 2018-01-01 | Resolved: 2018-01-01

## 2018-09-22 NOTE — ED TRIAGE NOTES
Pt sent in from [facility] for bleeding at trach site. Per nursing home bleeding only about 20 minutes.

## 2018-09-23 NOTE — PROGRESS NOTES
Pt suctioned with little return. No blood in secretions suctioned from trach. Pt refused placement of inner cannula in trach.

## 2018-09-23 NOTE — ED PROVIDER NOTES
Cardiovascular: Negative. Skin: Negative. Positives and Pertinent negatives as per HPI. Except as noted above in the ROS, all other systems were reviewed and negative.        PAST MEDICAL HISTORY     Past Medical History:   Diagnosis Date    Anemia     [facility]     COPD [facility]chronic obstructive pulmonary disease) [facility]     [facility]-tube [facility]     Larynx cancer [facility]     Neck mass     right    Port-a-cath in place          SURGICAL HISTORY       Past Surgical History:   Procedure Laterality Date    COLONOSCOPY  7/18/13        LARYNGOSCOPY  01/08/2018    Panendoscopy with multiple Biopsies    LUNG SURGERY      PANCREAS SURGERY      TRACHEOSTOMY  02/08/2018    UPPER GASTROINTESTINAL ENDOSCOPY  7/18/13        UPPER GASTROINTESTINAL ENDOSCOPY  8/31/2015    WITH ABLATION AND BIOPSY    UPPER GASTROINTESTINAL ENDOSCOPY  11/01/2017         CURRENT MEDICATIONS       Previous Medications    ACETAMINOPHEN (TYLENOL) 160 MG/5ML SOLUTION    640 mg by Per G Tube route every 4 hours as needed for Fever    ALBUTEROL-IPRATROPIUM (COMBIVENT RESPIMAT)  MCG/ACT AERS INHALER    Inhale 1 puff into the lungs every 6 hours    ALUMINUM & MAGNESIUM HYDROXIDE-SIMETHICONE (MAALOX) 200-200-20 MG/5ML SUSP SUSPENSION    30 mLs by Per G Tube route every 2 hours as needed for Indigestion    BISACODYL (DULCOLAX) 10 MG SUPPOSITORY    Place 10 mg rectally daily as needed for Constipation    CYCLOBENZAPRINE (FLEXERIL) 5 MG TABLET    5 mg by Per G Tube route 3 times daily as needed for Muscle spasms    IPRATROPIUM-ALBUTEROL (DUONEB) 0.5-2.5 (3) MG/3ML SOLN NEBULIZER SOLUTION    Inhale 1 vial into the lungs every 6 hours as needed for Shortness of Breath    MEGESTROL (MEGACE) 40 MG/ML SUSPENSION    400 mg by Per G Tube route daily as needed    MELATONIN 3 MG TABS TABLET    3 mg by Per G Tube route nightly    POLYETHYLENE GLYCOL (MIRALAX) PACK PACKET    17 g by Per G Tube route daily as needed    PROMETHAZINE